# Patient Record
Sex: MALE | URBAN - METROPOLITAN AREA
[De-identification: names, ages, dates, MRNs, and addresses within clinical notes are randomized per-mention and may not be internally consistent; named-entity substitution may affect disease eponyms.]

---

## 2017-11-21 ENCOUNTER — TELEPHONE (OUTPATIENT)
Dept: UROLOGY | Facility: CLINIC | Age: 57
End: 2017-11-21

## 2017-11-21 NOTE — TELEPHONE ENCOUNTER
"Called and spoke to [facility] and they are unfamiliar with this ordered. Called and spoke to patient for follow up. Offered to discuss this with Dr. Camacho the urologist who is in clinic tomorrow, but patient declined. Patient stated, \"Save yourself some time. I think there is such a time crunch because my urologist needs the results before a CT scan. I think I will have to just head down to [place] tomorrow to do it there.\" Patient was appreciative of the help and the call and will call back if questions arise in the future.     Daisy Dukes RN, BSN    "